# Patient Record
Sex: MALE | Race: BLACK OR AFRICAN AMERICAN
[De-identification: names, ages, dates, MRNs, and addresses within clinical notes are randomized per-mention and may not be internally consistent; named-entity substitution may affect disease eponyms.]

---

## 2020-01-09 ENCOUNTER — HOSPITAL ENCOUNTER (EMERGENCY)
Dept: HOSPITAL 47 - EC | Age: 40
Discharge: PSYCHIATRIC OR SUBSTANCE USE FACILITY | End: 2020-01-09
Payer: MEDICARE

## 2020-01-09 VITALS
DIASTOLIC BLOOD PRESSURE: 83 MMHG | TEMPERATURE: 98.2 F | SYSTOLIC BLOOD PRESSURE: 149 MMHG | HEART RATE: 85 BPM | RESPIRATION RATE: 18 BRPM

## 2020-01-09 DIAGNOSIS — F41.9: ICD-10-CM

## 2020-01-09 DIAGNOSIS — Z79.899: ICD-10-CM

## 2020-01-09 DIAGNOSIS — F17.200: ICD-10-CM

## 2020-01-09 DIAGNOSIS — F32.9: ICD-10-CM

## 2020-01-09 DIAGNOSIS — F20.9: ICD-10-CM

## 2020-01-09 DIAGNOSIS — F22: Primary | ICD-10-CM

## 2020-01-09 PROCEDURE — 80306 DRUG TEST PRSMV INSTRMNT: CPT

## 2020-01-09 PROCEDURE — 99285 EMERGENCY DEPT VISIT HI MDM: CPT

## 2020-01-09 PROCEDURE — 82075 ASSAY OF BREATH ETHANOL: CPT

## 2020-01-09 NOTE — ED
Psych HPI





- General


Chief Complaint: Psychiatric Symptoms


Stated Complaint: mental health


Time Seen by Provider: 01/09/20 15:55


Source: patient, family


Mode of arrival: ambulatory





- History of Present Illness


Initial Comments: 


40yo male presents emergency department for alisa.  Father states patient has 

history of schizophrenia and this has been ongoing since age 14 he states it 

comes in waves that sometimes he needs hospitalization.  Father states that he 

has been having delusional thoughts and has been paranoid.  He states is very 

typical time patient needs hospitalization.  He is patient's guardian.  He 

denies patient having any other unusual behaviors or complaints.  Denies patient

having suicidal or homicidal thoughts patient denies any suicidal or homicidal 

thoughts. Patient upon arrival has flight of ideas, speech.








- Related Data


                                Home Medications











 Medication  Instructions  Recorded  Confirmed


 


Divalproex Sodium [Depakote] 500 mg PO BID 01/09/20 01/09/20


 


QUEtiapine FUMARATE 200 mg PO HS 01/09/20 01/09/20


 


clonazePAM 1 mg PO BID 01/09/20 01/09/20


 


risperiDONE [RisperDAL] 2 mg PO HS 01/09/20 01/09/20











                                    Allergies











Allergy/AdvReac Type Severity Reaction Status Date / Time


 


No Known Allergies Allergy   Verified 01/09/20 15:44














Review of Systems


ROS Statement: 


Those systems with pertinent positive or pertinent negative responses have been 

documented in the HPI.





ROS Other: All systems not noted in ROS Statement are negative.





Past Medical History


Past Medical History: No Reported History


History of Any Multi-Drug Resistant Organisms: None Reported


Past Surgical History: No Surgical Hx Reported


Past Psychological History: Anxiety, Depression, Schizophrenia


Smoking Status: Current every day smoker


Past Alcohol Use History: None Reported


Past Drug Use History: None Reported





General Exam





- General Exam Comments


Initial Comments: 


General:  The patient is awake and alert


Eye:  +3 mm pupils are equal, round and reactive to light, extra-ocular 

movements are intact.  No nystagmus.  There is normal conjunctiva bilaterally.  

No signs of icterus.  


Ears, nose, mouth and throat:  There are moist mucous membranes and no oral 

lesions. 


Neck:  The neck is supple, there is no tenderness or JVD.  


Cardiovascular:  There is a regular rate and rhythm. No murmur, rub or gallop is

 appreciated.


Respiratory:  Lungs are clear to auscultation, respirations are non-labored, 

breath sounds are equal.  No wheezes, stridor, rales, or rhonchi.


Gastrointestinal:  Soft, non-distended, non-tender abdomen without masses or 

organomegaly noted. There is no rebound or guarding present. 


Musculoskeletal:  Normal ROM, no tenderness.  Strength 5/5. Sensation intact. 

Radial pulses equal bilaterally 2+.  


Neurological: CN II-XII intact grossly, There are no obvious motor or sensory de

ficits. Coordination appears grossly intact. Speech is normal.


Skin:  Skin is warm and dry and no rashes or lesions are noted. 


Psychiatric:  Cooperative, darting eye, rapid pressured speech, flight of ideas 





Limitations: no limitations





Course


                                   Vital Signs











  01/09/20 01/09/20





  15:40 23:19


 


Temperature 98.0 F 98.2 F


 


Pulse Rate 95 85


 


Respiratory 20 18





Rate  


 


Blood Pressure 147/90 149/83


 


O2 Sat by Pulse 100 100





Oximetry  














Medical Decision Making





- Medical Decision Making


39-year-old male with history of schizophrenia presenting for evaluation.  

Father states he was recently hospitalized.  He states he was fine after 

discharge however now has had increasing paranoid thoughts rapid speech 

delusions.  These are present on examination.  Otherwise there is no abnormal 

physical examination findings.  Patient is cooperative. Reseting comfortably in 

bed, evaluated by EPS who recommended transfer to outside facility for inpatient

 treatment after speaking with psychiatrist








- Lab Data


                                   Lab Results











  01/09/20 Range/Units





  16:09 


 


Urine Opiates Screen  Not Detected  (NotDetected)  


 


Ur Oxycodone Screen  Not Detected  (NotDetected)  


 


Urine Methadone Screen  Not Detected  (NotDetected)  


 


Ur Propoxyphene Screen  Not Detected  (NotDetected)  


 


Ur Barbiturates Screen  Not Detected  (NotDetected)  


 


U Tricyclic Antidepress  Not Detected  (NotDetected)  


 


Ur Phencyclidine Scrn  Not Detected  (NotDetected)  


 


Ur Amphetamines Screen  Not Detected  (NotDetected)  


 


U Methamphetamines Scrn  Not Detected  (NotDetected)  


 


U Benzodiazepines Scrn  Not Detected  (NotDetected)  


 


Urine Cocaine Screen  Not Detected  (NotDetected)  


 


U Marijuana (THC) Screen  Not Detected  (NotDetected)  














Disposition


Clinical Impression: 


 Schizophrenia, Paranoia (psychosis)





Disposition: TRANSFER TO PSYCH HOSP/UNIT


Condition: Stable


Is patient prescribed a controlled substance at d/c from ED?: No


Referrals: 


Nonstaff,Physician [Primary Care Provider] - 1-2 days


Time of Disposition: 23:24





- Out of Hospital Transfer - Req. Specs


Out of Hospital Transfer - Requested Specifics: [facility] ([facility]